# Patient Record
Sex: FEMALE | Race: WHITE | Employment: FULL TIME | URBAN - METROPOLITAN AREA
[De-identification: names, ages, dates, MRNs, and addresses within clinical notes are randomized per-mention and may not be internally consistent; named-entity substitution may affect disease eponyms.]

---

## 2018-01-25 ENCOUNTER — HOSPITAL ENCOUNTER (OUTPATIENT)
Dept: PULMONOLOGY | Age: 53
Discharge: HOME | End: 2018-01-25
Payer: COMMERCIAL

## 2018-01-25 DIAGNOSIS — R05.3 CHRONIC COUGH: ICD-10-CM

## 2018-01-25 PROCEDURE — 94729 DIFFUSING CAPACITY: CPT

## 2018-01-25 PROCEDURE — 94375 RESPIRATORY FLOW VOLUME LOOP: CPT

## 2018-01-25 PROCEDURE — 94726 PLETHYSMOGRAPHY LUNG VOLUMES: CPT

## 2018-01-26 NOTE — PROCEDURES
[address]  PULMONARY FUNCTION    Bri Thomason  MR#: [id number]  : 1965  ACCOUNT #: [de-identified]   DATE OF SERVICE: 2018    REASON FOR THE TEST:  Dyspnea. Spirometry and lung volumes were performed and they reveal:    1. No airflow obstruction. 2.  No restrictive lung disease. 3.  Moderate reduction in DLCO. 4.  Normal flow volume loop.       MD ERNIE Simmnos / Aureliano Gallegos  D: 2018 14:35     T: 2018 15:20  JOB #: [id number]  CC: Dr. Edu Inman